# Patient Record
Sex: MALE | URBAN - METROPOLITAN AREA
[De-identification: names, ages, dates, MRNs, and addresses within clinical notes are randomized per-mention and may not be internally consistent; named-entity substitution may affect disease eponyms.]

---

## 2022-07-14 ENCOUNTER — OUTSIDE SERVICES (OUTPATIENT)
Dept: NEUROLOGY | Age: 47
End: 2022-07-14
Payer: COMMERCIAL

## 2022-07-14 DIAGNOSIS — G56.03 BILATERAL CARPAL TUNNEL SYNDROME: Primary | ICD-10-CM

## 2022-07-14 PROCEDURE — 95913 NRV CNDJ TEST 13/> STUDIES: CPT | Performed by: PSYCHIATRY & NEUROLOGY

## 2022-07-14 PROCEDURE — 95886 MUSC TEST DONE W/N TEST COMP: CPT | Performed by: PSYCHIATRY & NEUROLOGY

## 2023-07-22 DIAGNOSIS — R60.9 DEPENDENT EDEMA: Primary | ICD-10-CM

## 2023-07-22 DIAGNOSIS — F41.1 GENERALIZED ANXIETY DISORDER: ICD-10-CM

## 2023-07-24 RX ORDER — POTASSIUM CHLORIDE 20 MEQ/1
20 TABLET, EXTENDED RELEASE ORAL DAILY
Qty: 90 TABLET | Refills: 0 | Status: SHIPPED | OUTPATIENT
Start: 2023-07-24 | End: 2023-10-23

## 2023-07-24 RX ORDER — FUROSEMIDE 20 MG/1
20 TABLET ORAL DAILY
Qty: 5 TABLET | Refills: 0 | OUTPATIENT
Start: 2023-07-24

## 2023-07-24 RX ORDER — POTASSIUM CHLORIDE 20 MEQ/1
20 TABLET, EXTENDED RELEASE ORAL DAILY
Qty: 5 TABLET | Refills: 0 | OUTPATIENT
Start: 2023-07-24

## 2023-07-24 RX ORDER — ESCITALOPRAM OXALATE 20 MG/1
20 TABLET ORAL DAILY
Qty: 90 TABLET | Refills: 0 | Status: SHIPPED | OUTPATIENT
Start: 2023-07-24 | End: 2024-02-27 | Stop reason: SDUPTHER

## 2023-07-24 RX ORDER — ESCITALOPRAM OXALATE 20 MG/1
20 TABLET ORAL DAILY
Qty: 5 TABLET | Refills: 0 | OUTPATIENT
Start: 2023-07-24

## 2023-07-24 RX ORDER — FUROSEMIDE 20 MG/1
20 TABLET ORAL DAILY
Qty: 90 TABLET | Refills: 0 | Status: SHIPPED | OUTPATIENT
Start: 2023-07-24 | End: 2023-10-23

## 2023-07-25 ENCOUNTER — TELEPHONE (OUTPATIENT)
Dept: PRIMARY CARE | Facility: CLINIC | Age: 48
End: 2023-07-25

## 2023-07-25 DIAGNOSIS — F31.73 BIPOLAR DISORDER, IN PARTIAL REMISSION, MOST RECENT EPISODE MANIC (MULTI): Primary | ICD-10-CM

## 2023-07-25 RX ORDER — DIVALPROEX SODIUM 500 MG/1
1000 TABLET, FILM COATED, EXTENDED RELEASE ORAL NIGHTLY
COMMUNITY
End: 2023-07-26 | Stop reason: SDUPTHER

## 2023-07-25 NOTE — TELEPHONE ENCOUNTER
Patient's wife left vm on Rx line requesting refill on Depakote to be sent to local [facility] in chart

## 2023-07-26 RX ORDER — DIVALPROEX SODIUM 500 MG/1
1000 TABLET, FILM COATED, EXTENDED RELEASE ORAL NIGHTLY
Qty: 180 TABLET | Refills: 0 | Status: SHIPPED | OUTPATIENT
Start: 2023-07-26 | End: 2023-10-23

## 2023-10-23 DIAGNOSIS — F31.73 BIPOLAR DISORDER, IN PARTIAL REMISSION, MOST RECENT EPISODE MANIC (MULTI): ICD-10-CM

## 2023-10-23 DIAGNOSIS — R60.9 DEPENDENT EDEMA: ICD-10-CM

## 2023-10-23 RX ORDER — FUROSEMIDE 20 MG/1
20 TABLET ORAL DAILY
Qty: 90 TABLET | Refills: 0 | Status: SHIPPED | OUTPATIENT
Start: 2023-10-23 | End: 2024-02-27 | Stop reason: SDUPTHER

## 2023-10-23 RX ORDER — DIVALPROEX SODIUM 500 MG/1
TABLET, FILM COATED, EXTENDED RELEASE ORAL
Qty: 180 TABLET | Refills: 0 | Status: SHIPPED | OUTPATIENT
Start: 2023-10-23 | End: 2024-02-27 | Stop reason: SDUPTHER

## 2023-10-23 RX ORDER — POTASSIUM CHLORIDE 20 MEQ/1
20 TABLET, EXTENDED RELEASE ORAL DAILY
Qty: 90 TABLET | Refills: 0 | Status: SHIPPED | OUTPATIENT
Start: 2023-10-23 | End: 2024-02-27 | Stop reason: SDUPTHER

## 2024-02-27 ENCOUNTER — OFFICE VISIT (OUTPATIENT)
Dept: PRIMARY CARE | Facility: CLINIC | Age: 49
End: 2024-02-27

## 2024-02-27 VITALS
DIASTOLIC BLOOD PRESSURE: 86 MMHG | HEART RATE: 94 BPM | WEIGHT: 288.8 LBS | SYSTOLIC BLOOD PRESSURE: 148 MMHG | OXYGEN SATURATION: 93 %

## 2024-02-27 DIAGNOSIS — F31.73 BIPOLAR DISORDER, IN PARTIAL REMISSION, MOST RECENT EPISODE MANIC (MULTI): ICD-10-CM

## 2024-02-27 DIAGNOSIS — F41.1 GENERALIZED ANXIETY DISORDER: ICD-10-CM

## 2024-02-27 DIAGNOSIS — R60.9 DEPENDENT EDEMA: ICD-10-CM

## 2024-02-27 PROCEDURE — 1036F TOBACCO NON-USER: CPT | Performed by: FAMILY MEDICINE

## 2024-02-27 PROCEDURE — 99213 OFFICE O/P EST LOW 20 MIN: CPT | Performed by: FAMILY MEDICINE

## 2024-02-27 RX ORDER — DIVALPROEX SODIUM 500 MG/1
TABLET, FILM COATED, EXTENDED RELEASE ORAL
Qty: 180 TABLET | Refills: 3 | Status: SHIPPED | OUTPATIENT
Start: 2024-02-27

## 2024-02-27 RX ORDER — ESCITALOPRAM OXALATE 20 MG/1
20 TABLET ORAL DAILY
Qty: 90 TABLET | Refills: 3 | Status: SHIPPED | OUTPATIENT
Start: 2024-02-27 | End: 2025-02-26

## 2024-02-27 RX ORDER — FUROSEMIDE 20 MG/1
20 TABLET ORAL DAILY
Qty: 90 TABLET | Refills: 3 | Status: SHIPPED | OUTPATIENT
Start: 2024-02-27

## 2024-02-27 RX ORDER — POTASSIUM CHLORIDE 20 MEQ/1
20 TABLET, EXTENDED RELEASE ORAL DAILY
Qty: 90 TABLET | Refills: 3 | Status: SHIPPED | OUTPATIENT
Start: 2024-02-27

## 2024-02-27 ASSESSMENT — ENCOUNTER SYMPTOMS
ABDOMINAL PAIN: 0
SLEEP DISTURBANCE: 0
SHORTNESS OF BREATH: 0
UNEXPECTED WEIGHT CHANGE: 0
DYSPHORIC MOOD: 0
NERVOUS/ANXIOUS: 0
FATIGUE: 0

## 2024-02-27 ASSESSMENT — PATIENT HEALTH QUESTIONNAIRE - PHQ9
1. LITTLE INTEREST OR PLEASURE IN DOING THINGS: NOT AT ALL
SUM OF ALL RESPONSES TO PHQ9 QUESTIONS 1 AND 2: 0
2. FEELING DOWN, DEPRESSED OR HOPELESS: NOT AT ALL

## 2024-02-27 NOTE — PROGRESS NOTES
Subjective   Patient ID: Jacoby Arambula is a 48 y.o. male who presents for No chief complaint on file..  Anxiety  Patient reports no chest pain, nervous/anxious behavior or shortness of breath.         Ganesh presents for follow up visit.  Generally he is doing well.  He was able to get medical marijuana card.  This in addition to his medication has significantly improved his mood.  Review of Systems   Constitutional:  Negative for fatigue and unexpected weight change.   HENT:  Positive for tinnitus. Negative for hearing loss.    Respiratory:  Negative for shortness of breath.    Cardiovascular:  Negative for chest pain.   Gastrointestinal:  Negative for abdominal pain.   Psychiatric/Behavioral:  Negative for dysphoric mood and sleep disturbance. The patient is not nervous/anxious.        Objective   Vitals:    02/27/24 1048   BP: 148/86   Pulse: 94   SpO2: 93%   Weight: 131 kg (288 lb 12.8 oz)      Physical Exam  Vitals reviewed.   Constitutional:       General: He is not in acute distress.     Appearance: He is obese.   HENT:      Head: Normocephalic and atraumatic.      Right Ear: Tympanic membrane, ear canal and external ear normal.      Left Ear: Tympanic membrane, ear canal and external ear normal.      Nose: Nose normal.      Mouth/Throat:      Mouth: Mucous membranes are moist.   Eyes:      Extraocular Movements: Extraocular movements intact.      Pupils: Pupils are equal, round, and reactive to light.   Cardiovascular:      Rate and Rhythm: Normal rate and regular rhythm.      Heart sounds: No murmur heard.     No friction rub. No gallop.   Pulmonary:      Effort: Pulmonary effort is normal.      Breath sounds: Normal breath sounds.   Abdominal:      General: There is no distension.      Palpations: Abdomen is soft.      Tenderness: There is no abdominal tenderness.   Musculoskeletal:      Cervical back: Neck supple. No tenderness.   Skin:     General: Skin is warm and dry.   Neurological:      General: No  focal deficit present.      Mental Status: He is alert and oriented to person, place, and time.   Psychiatric:         Mood and Affect: Mood normal.         Behavior: Behavior normal.         Assessment/Plan   There are no diagnoses linked to this encounter.    Problem List Items Addressed This Visit             ICD-10-CM    Generalized anxiety disorder F41.1     Well controlled on current medication         Relevant Medications    escitalopram (Lexapro) 20 mg tablet    Other Relevant Orders    Follow Up In Primary Care - Established    Bipolar disorder, in partial remission, most recent episode manic (CMS/[facility]) F31.73     Stable on current medication         Relevant Medications    divalproex (Depakote ER) 500 mg 24 hr tablet    Other Relevant Orders    Follow Up In Primary Care - Established    Dependent edema R60.9     Continue Furosemide and potassium         Relevant Medications    furosemide (Lasix) 20 mg tablet    potassium chloride CR 20 mEq ER tablet    Other Relevant Orders    Follow Up In Primary Care - Established